# Patient Record
Sex: FEMALE | Race: WHITE | NOT HISPANIC OR LATINO | Employment: STUDENT | URBAN - METROPOLITAN AREA
[De-identification: names, ages, dates, MRNs, and addresses within clinical notes are randomized per-mention and may not be internally consistent; named-entity substitution may affect disease eponyms.]

---
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
## 2025-06-20 ENCOUNTER — APPOINTMENT (OUTPATIENT)
Dept: PEDIATRICS | Facility: CLINIC | Age: 20
End: 2025-06-20
Payer: COMMERCIAL

## 2025-06-20 VITALS
DIASTOLIC BLOOD PRESSURE: 73 MMHG | HEART RATE: 58 BPM | TEMPERATURE: 98 F | WEIGHT: 143.13 LBS | HEIGHT: 66 IN | BODY MASS INDEX: 23 KG/M2 | SYSTOLIC BLOOD PRESSURE: 112 MMHG

## 2025-06-20 DIAGNOSIS — E28.2 PCOS (POLYCYSTIC OVARIAN SYNDROME): ICD-10-CM

## 2025-06-20 DIAGNOSIS — R79.89 ELEVATED LIVER FUNCTION TESTS: ICD-10-CM

## 2025-06-20 DIAGNOSIS — Z00.00 WELL ADULT EXAM: ICD-10-CM

## 2025-06-20 DIAGNOSIS — K82.9 GALLBLADDER DISEASE: ICD-10-CM

## 2025-06-20 DIAGNOSIS — Z23 NEED FOR TDAP VACCINATION: Primary | ICD-10-CM

## 2025-06-20 PROCEDURE — 1036F TOBACCO NON-USER: CPT | Performed by: PEDIATRICS

## 2025-06-20 PROCEDURE — 3008F BODY MASS INDEX DOCD: CPT | Performed by: PEDIATRICS

## 2025-06-20 PROCEDURE — 99395 PREV VISIT EST AGE 18-39: CPT | Performed by: PEDIATRICS

## 2025-06-20 PROCEDURE — 90715 TDAP VACCINE 7 YRS/> IM: CPT | Performed by: PEDIATRICS

## 2025-06-20 PROCEDURE — 90471 IMMUNIZATION ADMIN: CPT | Performed by: PEDIATRICS

## 2025-06-20 RX ORDER — METFORMIN HYDROCHLORIDE 500 MG/1
500 TABLET ORAL
COMMUNITY
Start: 2025-05-27 | End: 2026-05-27

## 2025-06-20 ASSESSMENT — PATIENT HEALTH QUESTIONNAIRE - PHQ9
6. FEELING BAD ABOUT YOURSELF - OR THAT YOU ARE A FAILURE OR HAVE LET YOURSELF OR YOUR FAMILY DOWN: NOT AT ALL
2. FEELING DOWN, DEPRESSED OR HOPELESS: NOT AT ALL
3. TROUBLE FALLING OR STAYING ASLEEP OR SLEEPING TOO MUCH: NOT AT ALL
1. LITTLE INTEREST OR PLEASURE IN DOING THINGS: NOT AT ALL
SUM OF ALL RESPONSES TO PHQ9 QUESTIONS 1 & 2: 0
4. FEELING TIRED OR HAVING LITTLE ENERGY: NOT AT ALL
10. IF YOU CHECKED OFF ANY PROBLEMS, HOW DIFFICULT HAVE THESE PROBLEMS MADE IT FOR YOU TO DO YOUR WORK, TAKE CARE OF THINGS AT HOME, OR GET ALONG WITH OTHER PEOPLE: NOT DIFFICULT AT ALL
10. IF YOU CHECKED OFF ANY PROBLEMS, HOW DIFFICULT HAVE THESE PROBLEMS MADE IT FOR YOU TO DO YOUR WORK, TAKE CARE OF THINGS AT HOME, OR GET ALONG WITH OTHER PEOPLE: NOT DIFFICULT AT ALL
5. POOR APPETITE OR OVEREATING: NOT AT ALL
1. LITTLE INTEREST OR PLEASURE IN DOING THINGS: NOT AT ALL
6. FEELING BAD ABOUT YOURSELF - OR THAT YOU ARE A FAILURE OR HAVE LET YOURSELF OR YOUR FAMILY DOWN: NOT AT ALL
3. TROUBLE FALLING OR STAYING ASLEEP: NOT AT ALL
2. FEELING DOWN, DEPRESSED OR HOPELESS: NOT AT ALL
9. THOUGHTS THAT YOU WOULD BE BETTER OFF DEAD, OR OF HURTING YOURSELF: NOT AT ALL
8. MOVING OR SPEAKING SO SLOWLY THAT OTHER PEOPLE COULD HAVE NOTICED. OR THE OPPOSITE, BEING SO FIGETY OR RESTLESS THAT YOU HAVE BEEN MOVING AROUND A LOT MORE THAN USUAL: NOT AT ALL
9. THOUGHTS THAT YOU WOULD BE BETTER OFF DEAD, OR OF HURTING YOURSELF: NOT AT ALL
4. FEELING TIRED OR HAVING LITTLE ENERGY: NOT AT ALL
5. POOR APPETITE OR OVEREATING: NOT AT ALL
7. TROUBLE CONCENTRATING ON THINGS, SUCH AS READING THE NEWSPAPER OR WATCHING TELEVISION: NOT AT ALL
SUM OF ALL RESPONSES TO PHQ QUESTIONS 1-9: 0
8. MOVING OR SPEAKING SO SLOWLY THAT OTHER PEOPLE COULD HAVE NOTICED. OR THE OPPOSITE - BEING SO FIDGETY OR RESTLESS THAT YOU HAVE BEEN MOVING AROUND A LOT MORE THAN USUAL: NOT AT ALL
7. TROUBLE CONCENTRATING ON THINGS, SUCH AS READING THE NEWSPAPER OR WATCHING TELEVISION: NOT AT ALL

## 2025-06-20 NOTE — PROGRESS NOTES
Subjective   Janessa is a 20 y.o. female who presents today  for her Health Maintenance and Supervision Exam.    General Health:  Janessa is in good health  Concerns today: none  Pt dg with PCOS and started on metformin 500 bid from gyn. If menses doesn't start she will cycle with provera.  Dg with gallbladder disease- sludge . Recommended for gallbladder removal but dad and mom want to try dietary measures first.     Social and Family History:  At home, during summer. Lives at [facility] in [place]  Parental support, work/family balance? Yes    Nutrition:  Current diet: watching to eat the foods that aren't fatty or bother her          Dental Care:  Janessa has a dental home: Yes  Dental hygiene regularly performed: Yes  Fluoridate water: Yes    Elimination:  Elimination patterns appropriate: Yes  Sleep:  Sleep patterns appropriate: Yes  Sleep problems: No    Behavior/Socialization:  Good relationships with parents and siblings? Yes  Supportive adult relationship? Yes  Normal peer relationships? Yes       Development/Education:  Age Appropriate: Yes  Janessa is senior at college. 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  Activities:  Physical Activity: yes  Limited screen/media use:   Extracurricular Activities/Hobbies/Interests: soccer    Sports Participation Screening:  Pre-sports participation survey questions assessed and passed? Yes    Menstrual Status:  PCOS just started metformin. Hasn't restarted menses or tried provera    Sexual History:  Dating:   Sexually Active: no    Drugs:  Tobacco: No  Alcohol: No  Uses drugs: No  Mental Health:  Depression Screening: PHQ9=0 ASQ=0  Thoughts of self harm/suicide: No    Risk Assessment:  Additional health risks: no  Safety Assessment:  Safety topics reviewed: yes    Objective   Physical Exam  Gen: Patient is alert and in NAD.   HEENT: Head is NC/AT. PERRL. EOMI. No conjunctival injection present. Fundi are NL; no  esotropia or exotropia. TMs are transparent with good landmarks. Nasopharynx is without significant edema or rhinorrhea. Oropharynx is clear with MMM.   No tonsillar enlargement or exudates present. Good dentition.  Neck: supple; no lymphadenopathy or masses.  CV: RRR, NL S1/S2, no murmurs.    Resp: CTA bilaterally; no wheezes or rhonchi; work of breathing is NL.    Abdomen: soft, non-tender, non-distended; no HSM or masses; positive bowel sounds.   : NL female genitalia, Johnson stage 4.   Musculoskeletal: Spine is straight; extremities are warm and dry with full ROM.     Neuro: NL gait, muscle tone, strength, and DTRs.     Skin: No significant rashes or lesions.        Assessment & Plan  Need for Tdap vaccination    Orders:    Tdap vaccine, age 7 years and older    Well adult exam  Healthy 20 y.o. female child.  1. Anticipatory guidance discussed. Gave handout on well child issues at this age.  2. Vision and hearing screen done  3. Vaccines as per orders if needed TdaP today declined mening b  4. Follow-up visit in 1 year for next well child visit, or sooner as needed.        PCOS (polycystic ovarian syndrome)  Under care of gyn. On metformin. Just started treatment       Elevated liver function tests  Gallbladder disease  Eval by gi at college. Gallbladder sludge with pain. Had labs in April. Pt avoids all alcohol and has been screened for hepatitis. Pt advised if fever and abd pain to seek medical care. She plans to follow up when she return to college in August. She was recommended for removal but is trying dietary measures for now.

## 2025-06-20 NOTE — ASSESSMENT & PLAN NOTE
Christy by gi at [facility]. Gallbladder sludge with pain. Had labs in April. Pt avoids all alcohol and has been screened for hepatitis. Pt advised if fever and abd pain to seek medical care. She plans to follow up when she return to college in August. She was recommended for removal but is trying dietary measures for now.